# Patient Record
Sex: MALE | Race: WHITE | NOT HISPANIC OR LATINO | Employment: OTHER | RURAL
[De-identification: names, ages, dates, MRNs, and addresses within clinical notes are randomized per-mention and may not be internally consistent; named-entity substitution may affect disease eponyms.]

---

## 2018-01-03 ENCOUNTER — HISTORICAL (OUTPATIENT)
Dept: ADMINISTRATIVE | Facility: HOSPITAL | Age: 68
End: 2018-01-03

## 2018-01-04 LAB
LAB AP CLINICAL INFORMATION: NORMAL
LAB AP DIAGNOSIS - HISTORICAL: NORMAL
LAB AP GROSS PATHOLOGY - HISTORICAL: NORMAL
LAB AP SPECIMEN SUBMITTED - HISTORICAL: NORMAL

## 2020-09-24 ENCOUNTER — HISTORICAL (OUTPATIENT)
Dept: ADMINISTRATIVE | Facility: HOSPITAL | Age: 70
End: 2020-09-24

## 2020-09-24 LAB
ALBUMIN SERPL BCP-MCNC: 3.4 G/DL (ref 3.4–5)
ALBUMIN/GLOB SERPL: 0.9 {RATIO}
ALP SERPL-CCNC: 44 U/L (ref 46–116)
ALT SERPL W P-5'-P-CCNC: 15 U/L (ref 14–63)
ANION GAP SERPL CALCULATED.3IONS-SCNC: 15 MMOL/L
AST SERPL W P-5'-P-CCNC: 31 U/L (ref 15–37)
BASOPHILS # BLD AUTO: 0.02 X10E3/UL (ref 0–0.2)
BASOPHILS NFR BLD AUTO: 0.2 % (ref 0–1)
BILIRUB SERPL-MCNC: 0.9 MG/DL (ref 0.2–1)
BUN SERPL-MCNC: 20 MG/DL (ref 9–20)
BUN/CREAT SERPL: 13.6
CALCIUM SERPL-MCNC: 8.2 MG/DL (ref 8.5–10.1)
CHLORIDE SERPL-SCNC: 98 MMOL/L (ref 98–107)
CO2 SERPL-SCNC: 26 MMOL/L (ref 21–32)
CREAT SERPL-MCNC: 1.47 MG/DL (ref 0.66–1.25)
EOSINOPHIL # BLD AUTO: 0 X10E3/UL (ref 0–0.5)
EOSINOPHIL NFR BLD AUTO: 0 % (ref 1–4)
ERYTHROCYTE [DISTWIDTH] IN BLOOD BY AUTOMATED COUNT: 12.7 % (ref 11.5–14.5)
FLUAV AG UPPER RESP QL IA.RAPID: NEGATIVE
FLUBV AG UPPER RESP QL IA.RAPID: POSITIVE
GLOBULIN SER-MCNC: 4 G/DL
GLUCOSE SERPL-MCNC: 97 MG/DL (ref 74–106)
HCT VFR BLD AUTO: 39.7 % (ref 40–54)
HGB BLD-MCNC: 13.3 G/DL (ref 13.5–18)
LYMPHOCYTES # BLD AUTO: 0.88 X10E3/UL (ref 1–4.8)
LYMPHOCYTES NFR BLD AUTO: 9.4 % (ref 27–41)
MAGNESIUM SERPL-MCNC: 1.9 MG/DL (ref 1.8–2.4)
MCH RBC QN AUTO: 31.6 PG (ref 27–31)
MCHC RBC AUTO-ENTMCNC: 33.5 G/DL (ref 32–36)
MCV RBC AUTO: 94 FL (ref 80–96)
MONOCYTES # BLD AUTO: 0.74 X10E3/UL (ref 0–0.8)
MONOCYTES NFR BLD AUTO: 7.9 % (ref 2–6)
MPC BLD CALC-MCNC: 10 FL (ref 9.4–12.4)
NEUTROPHILS # BLD AUTO: 7.69 X10E3/UL (ref 1.8–7.7)
NEUTROPHILS NFR BLD AUTO: 82.5 % (ref 53–65)
NT-PROBNP SERPL-MCNC: 756 PG/ML (ref 5–125)
PLATELET # BLD AUTO: 141 X10E3/UL (ref 150–400)
POTASSIUM SERPL-SCNC: 4.2 MMOL/L (ref 3.5–5.1)
PROT SERPL-MCNC: 7.4 G/DL (ref 6.4–8.2)
RBC # BLD AUTO: 4.21 X10E6/UL (ref 4.6–6.2)
SODIUM SERPL-SCNC: 135 MMOL/L (ref 136–145)
TROPONIN I SERPL-MCNC: 0.04 NG/ML (ref 0–0.06)
TROPONIN I SERPL-MCNC: 0.17 NG/ML (ref 0–0.06)
WBC # BLD AUTO: 9.33 X10E3/UL (ref 4.5–11)

## 2020-09-25 ENCOUNTER — HISTORICAL (OUTPATIENT)
Dept: ADMINISTRATIVE | Facility: HOSPITAL | Age: 70
End: 2020-09-25

## 2020-09-25 LAB
ALBUMIN SERPL BCP-MCNC: 2.7 G/DL (ref 3.4–5)
ALBUMIN/GLOB SERPL: 0.7 {RATIO}
ALP SERPL-CCNC: 37 U/L (ref 46–116)
ALT SERPL W P-5'-P-CCNC: 33 U/L (ref 14–63)
AMORPH PHOS CRY #/AREA URNS LPF: ABNORMAL /LPF
ANION GAP SERPL CALCULATED.3IONS-SCNC: 14 MMOL/L
AST SERPL W P-5'-P-CCNC: 113 U/L (ref 15–37)
BACTERIA #/AREA URNS HPF: ABNORMAL /HPF
BASOPHILS # BLD AUTO: 0 X10E3/UL (ref 0–0.2)
BASOPHILS NFR BLD AUTO: 0 % (ref 0–1)
BILIRUB SERPL-MCNC: 0.6 MG/DL (ref 0.2–1)
BILIRUB UR QL STRIP: ABNORMAL MG/DL
BUN SERPL-MCNC: 23 MG/DL (ref 9–20)
BUN/CREAT SERPL: 18
CALCIUM SERPL-MCNC: 7.7 MG/DL (ref 8.5–10.1)
CHLORIDE SERPL-SCNC: 102 MMOL/L (ref 98–107)
CHOLEST SERPL-MCNC: 105 MG/DL
CHOLEST/HDLC SERPL: 2.2 {RATIO}
CLARITY UR: ABNORMAL
CLARITY UR: ABNORMAL
CO2 SERPL-SCNC: 24 MMOL/L (ref 21–32)
COLOR UR: ABNORMAL
COLOR UR: ABNORMAL
CREAT SERPL-MCNC: 1.28 MG/DL (ref 0.66–1.25)
EOSINOPHIL # BLD AUTO: 0 X10E3/UL (ref 0–0.5)
EOSINOPHIL NFR BLD AUTO: 0 % (ref 1–4)
ERYTHROCYTE [DISTWIDTH] IN BLOOD BY AUTOMATED COUNT: 12.7 % (ref 11.5–14.5)
GLOBULIN SER-MCNC: 3.8 G/DL
GLUCOSE SERPL-MCNC: 141 MG/DL (ref 74–106)
GLUCOSE UR STRIP-MCNC: NORMAL MG/DL
HCT VFR BLD AUTO: 36.8 % (ref 40–54)
HDLC SERPL-MCNC: 47 MG/DL
HGB BLD-MCNC: 12.6 G/DL (ref 13.5–18)
KETONES UR STRIP-SCNC: ABNORMAL MG/DL
LDLC SERPL CALC-MCNC: 41 MG/DL
LEUKOCYTE ESTERASE UR QL STRIP: NEGATIVE LEU/UL
LYMPHOCYTES # BLD AUTO: 0.48 X10E3/UL (ref 1–4.8)
LYMPHOCYTES NFR BLD AUTO: 4.5 % (ref 27–41)
LYMPHOCYTES NFR BLD MANUAL: 6 % (ref 27–41)
MCH RBC QN AUTO: 32.2 PG (ref 27–31)
MCHC RBC AUTO-ENTMCNC: 34.2 G/DL (ref 32–36)
MCV RBC AUTO: 94 FL (ref 80–96)
MONOCYTES # BLD AUTO: 0.33 X10E3/UL (ref 0–0.8)
MONOCYTES NFR BLD AUTO: 3.1 % (ref 2–6)
MONOCYTES NFR BLD MANUAL: 3 % (ref 2–6)
MPC BLD CALC-MCNC: 10.1 FL (ref 9.4–12.4)
MUCOUS THREADS #/AREA URNS HPF: ABNORMAL /HPF
NEUTROPHILS # BLD AUTO: 9.86 X10E3/UL (ref 1.8–7.7)
NEUTROPHILS NFR BLD AUTO: 92.4 % (ref 53–65)
NEUTS BAND NFR BLD MANUAL: 14 % (ref 1–5)
NEUTS SEG NFR BLD MANUAL: 77 % (ref 50–62)
NITRITE UR QL STRIP: NEGATIVE MG/DL
NT-PROBNP SERPL-MCNC: 1258 PG/ML (ref 5–125)
PH UR STRIP: 5.5 PH UNITS (ref 5–8)
PLATELET # BLD AUTO: 139 X10E3/UL (ref 150–400)
PLATELET MORPHOLOGY: ABNORMAL
POTASSIUM SERPL-SCNC: 4 MMOL/L (ref 3.5–5.1)
PROT SERPL-MCNC: 6.5 G/DL (ref 6.4–8.2)
PROT UR QL STRIP: 100 MG/DL
RBC # BLD AUTO: 3.91 X10E6/UL (ref 4.6–6.2)
RBC # UR STRIP: ABNORMAL ERY/UL
RBC #/AREA URNS HPF: ABNORMAL /HPF (ref 0–3)
RBC MORPH BLD: NORMAL
SODIUM SERPL-SCNC: 136 MMOL/L (ref 136–145)
SP GR UR STRIP: >=1.03 (ref 1–1.03)
SQUAMOUS #/AREA URNS LPF: ABNORMAL /LPF
TRIGL SERPL-MCNC: 83 MG/DL
TROPONIN I SERPL-MCNC: 0.09 NG/ML (ref 0–0.06)
TROPONIN I SERPL-MCNC: 0.1 NG/ML (ref 0–0.06)
TROPONIN I SERPL-MCNC: 0.15 NG/ML (ref 0–0.06)
UROBILINOGEN UR STRIP-ACNC: 0.2 MG/DL
WBC # BLD AUTO: 10.67 X10E3/UL (ref 4.5–11)
WBC #/AREA URNS HPF: ABNORMAL /HPF (ref 0–5)

## 2020-09-26 ENCOUNTER — HISTORICAL (OUTPATIENT)
Dept: ADMINISTRATIVE | Facility: HOSPITAL | Age: 70
End: 2020-09-26

## 2020-09-26 LAB — TROPONIN I SERPL-MCNC: 0.07 NG/ML (ref 0–0.06)

## 2020-09-27 LAB
REPORT: NO GROWTH
REPORT: NORMAL

## 2020-10-01 LAB
REPORT: NORMAL

## 2021-08-30 DIAGNOSIS — C44.229 SQUAMOUS CELL CANCER OF SKIN OF HELIX OF LEFT EAR: Primary | ICD-10-CM

## 2022-05-20 ENCOUNTER — OFFICE VISIT (OUTPATIENT)
Dept: FAMILY MEDICINE | Facility: CLINIC | Age: 72
End: 2022-05-20
Payer: MEDICARE

## 2022-05-20 VITALS
DIASTOLIC BLOOD PRESSURE: 71 MMHG | HEIGHT: 69 IN | WEIGHT: 164 LBS | BODY MASS INDEX: 24.29 KG/M2 | SYSTOLIC BLOOD PRESSURE: 113 MMHG | HEART RATE: 74 BPM

## 2022-05-20 DIAGNOSIS — Z76.0 PRESCRIPTION REFILL: ICD-10-CM

## 2022-05-20 DIAGNOSIS — L30.9 DERMATITIS: Primary | ICD-10-CM

## 2022-05-20 PROCEDURE — 1160F PR REVIEW ALL MEDS BY PRESCRIBER/CLIN PHARMACIST DOCUMENTED: ICD-10-PCS | Mod: ,,, | Performed by: FAMILY MEDICINE

## 2022-05-20 PROCEDURE — 1159F MED LIST DOCD IN RCRD: CPT | Mod: ,,, | Performed by: FAMILY MEDICINE

## 2022-05-20 PROCEDURE — 1160F RVW MEDS BY RX/DR IN RCRD: CPT | Mod: ,,, | Performed by: FAMILY MEDICINE

## 2022-05-20 PROCEDURE — 1159F PR MEDICATION LIST DOCUMENTED IN MEDICAL RECORD: ICD-10-PCS | Mod: ,,, | Performed by: FAMILY MEDICINE

## 2022-05-20 PROCEDURE — 3008F PR BODY MASS INDEX (BMI) DOCUMENTED: ICD-10-PCS | Mod: ,,, | Performed by: FAMILY MEDICINE

## 2022-05-20 PROCEDURE — 99213 PR OFFICE/OUTPT VISIT, EST, LEVL III, 20-29 MIN: ICD-10-PCS | Mod: ,,, | Performed by: FAMILY MEDICINE

## 2022-05-20 PROCEDURE — 3074F SYST BP LT 130 MM HG: CPT | Mod: ,,, | Performed by: FAMILY MEDICINE

## 2022-05-20 PROCEDURE — 3074F PR MOST RECENT SYSTOLIC BLOOD PRESSURE < 130 MM HG: ICD-10-PCS | Mod: ,,, | Performed by: FAMILY MEDICINE

## 2022-05-20 PROCEDURE — 3008F BODY MASS INDEX DOCD: CPT | Mod: ,,, | Performed by: FAMILY MEDICINE

## 2022-05-20 PROCEDURE — 3078F PR MOST RECENT DIASTOLIC BLOOD PRESSURE < 80 MM HG: ICD-10-PCS | Mod: ,,, | Performed by: FAMILY MEDICINE

## 2022-05-20 PROCEDURE — 99213 OFFICE O/P EST LOW 20 MIN: CPT | Mod: ,,, | Performed by: FAMILY MEDICINE

## 2022-05-20 PROCEDURE — 3078F DIAST BP <80 MM HG: CPT | Mod: ,,, | Performed by: FAMILY MEDICINE

## 2022-05-20 RX ORDER — CARVEDILOL 3.12 MG/1
3.12 TABLET ORAL 2 TIMES DAILY WITH MEALS
COMMUNITY

## 2022-05-20 RX ORDER — ROSUVASTATIN CALCIUM 40 MG/1
40 TABLET, COATED ORAL DAILY
COMMUNITY
Start: 2022-05-11

## 2022-05-20 RX ORDER — NITROGLYCERIN 0.4 MG/1
0.4 TABLET SUBLINGUAL EVERY 5 MIN PRN
COMMUNITY

## 2022-05-20 RX ORDER — FLUCONAZOLE 150 MG/1
150 TABLET ORAL DAILY
COMMUNITY
End: 2022-05-20 | Stop reason: SDUPTHER

## 2022-05-20 RX ORDER — LOSARTAN POTASSIUM 25 MG/1
25 TABLET ORAL DAILY
COMMUNITY
Start: 2021-12-01

## 2022-05-20 RX ORDER — ASPIRIN 81 MG/1
81 TABLET ORAL DAILY
COMMUNITY

## 2022-05-20 RX ORDER — CLOPIDOGREL BISULFATE 75 MG/1
75 TABLET ORAL DAILY
COMMUNITY
Start: 2022-05-17

## 2022-05-20 RX ORDER — FLUCONAZOLE 150 MG/1
TABLET ORAL
Qty: 2 TABLET | Refills: 1 | Status: SHIPPED | OUTPATIENT
Start: 2022-05-20 | End: 2022-09-20 | Stop reason: SDUPTHER

## 2022-05-20 RX ORDER — ESOMEPRAZOLE MAGNESIUM 40 MG/1
40 CAPSULE, DELAYED RELEASE ORAL
COMMUNITY

## 2022-05-20 NOTE — PROGRESS NOTES
Domingo Wayne MD   [facility]  MEDICAL GROUP [address] [address]  [phone]      PATIENT NAME: Eugene Morrow  : 1950  DATE: 22  MRN: [id number]      Billing Provider: Domingo Wayne MD  Level of Service:   Patient PCP Information       Provider PCP Type    Domingo Wayne MD General            Reason for Visit / Chief Complaint: Medication Refill       Update PCP  Update Chief Complaint         History of Present Illness / Problem Focused Workflow     Eugene Morrow presents to the clinic with Medication Refill       70 yo WM with rash on torso.  Has had similar before that responded very rapidly to diflucan.  Would like refill.      Medication Refill  Associated symptoms include a rash. Pertinent negatives include no chills, coughing, fever or sore throat.       Review of Systems     Review of Systems   Constitutional: Negative for chills and fever.   HENT: Negative for sore throat.    Respiratory: Negative for cough.    Integumentary:  Positive for rash.        Medical / Social / Family History   History reviewed. No pertinent past medical history.    History reviewed. No pertinent surgical history.    Social History    reports that he has been smoking cigarettes. He has never used smokeless tobacco. He reports current alcohol use.   Social History     Tobacco Use    Smoking status: Current Every Day Smoker     Types: Cigarettes    Smokeless tobacco: Never Used   Substance Use Topics    Alcohol use: Yes       Family History  History reviewed. No pertinent family history.    Medications and Allergies     Medications  No outpatient medications have been marked as taking for the 22 encounter (Office Visit) with Domingo Wayne MD.       Allergies  Review of patient's allergies indicates:   Allergen Reactions    Brilinta [ticagrelor]        Physical Examination     Vitals:    22 1406    BP: 113/71   Pulse: 74     Physical Exam  Vitals reviewed.   Constitutional:       Appearance: Normal appearance.   HENT:      Head: Normocephalic and atraumatic.   Eyes:      Extraocular Movements: Extraocular movements intact.      Conjunctiva/sclera: Conjunctivae normal.      Pupils: Pupils are equal, round, and reactive to light.   Cardiovascular:      Rate and Rhythm: Normal rate and regular rhythm.      Heart sounds: Normal heart sounds.   Pulmonary:      Effort: Pulmonary effort is normal.      Breath sounds: Normal breath sounds.   Musculoskeletal:         General: Normal range of motion.      Cervical back: Normal range of motion and neck supple.   Skin:     General: Skin is warm and dry.      Findings: Rash (erythema upper chest) present.   Neurological:      General: No focal deficit present.      Mental Status: He is alert and oriented to person, place, and time.   Psychiatric:         Mood and Affect: Mood normal.         Behavior: Behavior normal.          Assessment and Plan (including Health Maintenance)      Problem List  Smart Traackr  Document Outside HM   :    Plan:         Health Maintenance Due   Topic Date Due    Hepatitis C Screening  Never done    Lipid Panel  Never done    COVID-19 Vaccine (1) Never done    Pneumococcal Vaccines (Age 65+) (1 - PCV) Never done    TETANUS VACCINE  Never done    Colorectal Cancer Screening  Never done    Shingles Vaccine (1 of 2) Never done    Abdominal Aortic Aneurysm Screening  Never done       Problem List Items Addressed This Visit    None     Visit Diagnoses     Dermatitis    -  Primary    Relevant Medications    fluconazole (DIFLUCAN) 150 MG Tab    Prescription refill              Health Maintenance Topics with due status: Not Due       Topic Last Completion Date    Influenza Vaccine Not Due       No future appointments.         Signature:  MD BABATUNDE Cheek [facility]  MEDICAL GROUP [facility]  [address]  [facility] [address]  [phone]    Date of encounter: 5/20/22

## 2022-08-11 ENCOUNTER — OFFICE VISIT (OUTPATIENT)
Dept: FAMILY MEDICINE | Facility: CLINIC | Age: 72
End: 2022-08-11
Payer: MEDICARE

## 2022-08-11 VITALS
HEART RATE: 83 BPM | SYSTOLIC BLOOD PRESSURE: 99 MMHG | BODY MASS INDEX: 23.55 KG/M2 | TEMPERATURE: 101 F | DIASTOLIC BLOOD PRESSURE: 61 MMHG | WEIGHT: 159 LBS | HEIGHT: 69 IN

## 2022-08-11 DIAGNOSIS — J34.89 STUFFY AND RUNNY NOSE: ICD-10-CM

## 2022-08-11 DIAGNOSIS — R50.9 SUBJECTIVE FEVER: Primary | ICD-10-CM

## 2022-08-11 DIAGNOSIS — R05.9 COUGH: ICD-10-CM

## 2022-08-11 LAB
CTP QC/QA: YES
FLUAV AG NPH QL: NEGATIVE
FLUBV AG NPH QL: NEGATIVE
SARS-COV-2 AG RESP QL IA.RAPID: NEGATIVE

## 2022-08-11 PROCEDURE — 4010F PR ACE/ARB THEARPY RXD/TAKEN: ICD-10-PCS | Mod: ,,, | Performed by: FAMILY MEDICINE

## 2022-08-11 PROCEDURE — 1160F PR REVIEW ALL MEDS BY PRESCRIBER/CLIN PHARMACIST DOCUMENTED: ICD-10-PCS | Mod: ,,, | Performed by: FAMILY MEDICINE

## 2022-08-11 PROCEDURE — 1160F RVW MEDS BY RX/DR IN RCRD: CPT | Mod: ,,, | Performed by: FAMILY MEDICINE

## 2022-08-11 PROCEDURE — 3008F BODY MASS INDEX DOCD: CPT | Mod: ,,, | Performed by: FAMILY MEDICINE

## 2022-08-11 PROCEDURE — 99213 OFFICE O/P EST LOW 20 MIN: CPT | Mod: ,,, | Performed by: FAMILY MEDICINE

## 2022-08-11 PROCEDURE — 3078F PR MOST RECENT DIASTOLIC BLOOD PRESSURE < 80 MM HG: ICD-10-PCS | Mod: ,,, | Performed by: FAMILY MEDICINE

## 2022-08-11 PROCEDURE — 99213 PR OFFICE/OUTPT VISIT, EST, LEVL III, 20-29 MIN: ICD-10-PCS | Mod: ,,, | Performed by: FAMILY MEDICINE

## 2022-08-11 PROCEDURE — 3074F PR MOST RECENT SYSTOLIC BLOOD PRESSURE < 130 MM HG: ICD-10-PCS | Mod: ,,, | Performed by: FAMILY MEDICINE

## 2022-08-11 PROCEDURE — 3078F DIAST BP <80 MM HG: CPT | Mod: ,,, | Performed by: FAMILY MEDICINE

## 2022-08-11 PROCEDURE — 87428 SARSCOV & INF VIR A&B AG IA: CPT | Mod: RHCUB | Performed by: FAMILY MEDICINE

## 2022-08-11 PROCEDURE — 1159F PR MEDICATION LIST DOCUMENTED IN MEDICAL RECORD: ICD-10-PCS | Mod: ,,, | Performed by: FAMILY MEDICINE

## 2022-08-11 PROCEDURE — 3074F SYST BP LT 130 MM HG: CPT | Mod: ,,, | Performed by: FAMILY MEDICINE

## 2022-08-11 PROCEDURE — 3008F PR BODY MASS INDEX (BMI) DOCUMENTED: ICD-10-PCS | Mod: ,,, | Performed by: FAMILY MEDICINE

## 2022-08-11 PROCEDURE — 1159F MED LIST DOCD IN RCRD: CPT | Mod: ,,, | Performed by: FAMILY MEDICINE

## 2022-08-11 PROCEDURE — 4010F ACE/ARB THERAPY RXD/TAKEN: CPT | Mod: ,,, | Performed by: FAMILY MEDICINE

## 2022-08-11 NOTE — PROGRESS NOTES
Jamie Rosas IV, DO  [facility]  [address] [place] [address]  Phone: [phone]      Subjective     Name: Eugene Morrow   Sex: male  YOB: 1950 (71 y.o.)  MRN: [id number]  Visit Date: 08/11/2022   Chief Complaint: Fever, Generalized Body Aches (No appetite/Started monday), Arm Pain, and Nasal Congestion        HISTORY OF PRESENT ILLNESS:    Patient has cough and stuffy nose for 3 days now, onset Monday.  covid and flu negative in office but patient is not vaccinated and high suspicion for SARS-CoV-2.  Advised supportive care and sent home 2 tests for patient to follow.  Also requested that he take his temperature objectively for the next few days.  Patient to call if +ve result on home test or if symptoms worsen.  Plan courtesy call on Monday to check progression.        PAST MEDICAL HISTORY:  Significant Diagnoses - Patient  has no past medical history on file.  Medications - Patient has a current medication list which includes the following long-term medication(s): aspirin, carvedilol, clopidogrel, esomeprazole, losartan, nitroglycerin, and rosuvastatin.   Allergies - Patient is allergic to brilinta [ticagrelor].  Surgeries - Patient  has no past surgical history on file.  Family History - Patient family history is not on file.      SOCIAL HISTORY:  Tobacco - Patient  reports that he has been smoking cigarettes. He has never used smokeless tobacco.   Alcohol - Patient  reports current alcohol use.   Recreational Drugs - Patient  has no history on file for drug use.       Review of Systems   Constitutional: Negative for fatigue and fever.   HENT: Positive for rhinorrhea. Negative for hearing loss, sore throat and tinnitus.    Eyes: Negative for pain.   Respiratory: Positive for cough. Negative for chest tightness and shortness of breath.    Cardiovascular: Negative for chest pain.   Endocrine: Negative for polydipsia, polyphagia and polyuria.  "  Genitourinary: Negative for dysuria.   Integumentary:  Negative for pallor, rash and wound.   Allergic/Immunologic: Negative.    Neurological: Positive for light-headedness. Negative for seizures, syncope, speech difficulty, numbness and headaches.   Psychiatric/Behavioral: Negative for agitation and suicidal ideas.   All other systems reviewed and are negative.         History reviewed. No pertinent past medical history.     Review of patient's allergies indicates:   Allergen Reactions    Brilinta [ticagrelor]         History reviewed. No pertinent surgical history.     History reviewed. No pertinent family history.    Current Outpatient Medications   Medication Instructions    aspirin (ECOTRIN) 81 mg, Oral, Daily    carvediloL (COREG) 3.125 mg, Oral, 2 times daily with meals    clopidogreL (PLAVIX) 75 mg, Oral, Daily    esomeprazole (NEXIUM) 40 mg, Oral, Before breakfast    fluconazole (DIFLUCAN) 150 MG Tab Take one tablet PO now and repeat in 3 days    losartan (COZAAR) 25 mg, Oral, Daily    nitroGLYCERIN (NITROSTAT) 0.4 mg, Sublingual, Every 5 min PRN    rosuvastatin (CRESTOR) 40 mg, Oral, Daily        Objective     BP 99/61   Pulse 83   Temp (!) 101.3 °F (38.5 °C)   Ht 5' 9" (1.753 m)   Wt 72.1 kg (159 lb)   BMI 23.48 kg/m²     Physical Exam  Constitutional:       General: He is not in acute distress.     Appearance: Normal appearance. He is not ill-appearing.   HENT:      Head: Normocephalic and atraumatic.      Right Ear: External ear normal.      Left Ear: External ear normal.   Eyes:      Extraocular Movements: Extraocular movements intact.      Conjunctiva/sclera: Conjunctivae normal.   Cardiovascular:      Rate and Rhythm: Normal rate and regular rhythm.      Heart sounds: Normal heart sounds.   Pulmonary:      Effort: Pulmonary effort is normal. No respiratory distress.      Breath sounds: Normal breath sounds. No wheezing, rhonchi or rales.   Abdominal:      General: Abdomen is flat.     "  Palpations: Abdomen is soft.   Musculoskeletal:      Cervical back: Normal range of motion. No rigidity or tenderness.   Skin:     General: Skin is warm and dry.   Neurological:      Mental Status: He is alert.   Psychiatric:         Mood and Affect: Mood normal.         Behavior: Behavior normal.          All recently obtained labs have been reviewed and discussed in detail with the patient.   Assessment     1. Subjective fever    2. Cough    3. Stuffy and runny nose         Plan        Problem List Items Addressed This Visit    None     Visit Diagnoses     Subjective fever    -  Primary    Relevant Orders    POCT SARS-COV2 (COVID) with Flu Antigen (Completed)    Cough        Stuffy and runny nose              Follow up if symptoms worsen or fail to improve.    Jamie Rosas, DO  [facility]

## 2022-08-15 ENCOUNTER — TELEPHONE (OUTPATIENT)
Dept: FAMILY MEDICINE | Facility: CLINIC | Age: 72
End: 2022-08-15
Payer: MEDICARE

## 2022-08-15 NOTE — TELEPHONE ENCOUNTER
Called patient to check on him.  Wife states he was in the hospital until yesterday and did not have covid.  She was unhappy with me for suspecting covid and hung phone up on me before I could ask anything further.

## 2022-09-20 DIAGNOSIS — L30.9 DERMATITIS: ICD-10-CM

## 2022-09-20 RX ORDER — FLUCONAZOLE 150 MG/1
TABLET ORAL
Qty: 2 TABLET | Refills: 1 | Status: SHIPPED | OUTPATIENT
Start: 2022-09-20 | End: 2022-09-21 | Stop reason: SDUPTHER

## 2022-09-21 DIAGNOSIS — L30.9 DERMATITIS: ICD-10-CM

## 2022-09-21 RX ORDER — FLUCONAZOLE 150 MG/1
TABLET ORAL
Qty: 2 TABLET | Refills: 1 | Status: SHIPPED | OUTPATIENT
Start: 2022-09-21 | End: 2023-11-16 | Stop reason: SDUPTHER

## 2023-05-09 ENCOUNTER — OFFICE VISIT (OUTPATIENT)
Dept: FAMILY MEDICINE | Facility: CLINIC | Age: 73
End: 2023-05-09
Payer: MEDICARE

## 2023-05-09 VITALS — SYSTOLIC BLOOD PRESSURE: 112 MMHG | DIASTOLIC BLOOD PRESSURE: 81 MMHG | HEART RATE: 73 BPM

## 2023-05-09 DIAGNOSIS — J06.9 UPPER RESPIRATORY TRACT INFECTION, UNSPECIFIED TYPE: Primary | ICD-10-CM

## 2023-05-09 DIAGNOSIS — J31.0 RHINITIS, UNSPECIFIED TYPE: Chronic | ICD-10-CM

## 2023-05-09 DIAGNOSIS — I25.2 HISTORY OF MI (MYOCARDIAL INFARCTION): Chronic | ICD-10-CM

## 2023-05-09 PROCEDURE — 1159F MED LIST DOCD IN RCRD: CPT | Mod: ,,, | Performed by: FAMILY MEDICINE

## 2023-05-09 PROCEDURE — 1160F PR REVIEW ALL MEDS BY PRESCRIBER/CLIN PHARMACIST DOCUMENTED: ICD-10-PCS | Mod: ,,, | Performed by: FAMILY MEDICINE

## 2023-05-09 PROCEDURE — 1159F PR MEDICATION LIST DOCUMENTED IN MEDICAL RECORD: ICD-10-PCS | Mod: ,,, | Performed by: FAMILY MEDICINE

## 2023-05-09 PROCEDURE — 3074F PR MOST RECENT SYSTOLIC BLOOD PRESSURE < 130 MM HG: ICD-10-PCS | Mod: ,,, | Performed by: FAMILY MEDICINE

## 2023-05-09 PROCEDURE — 4010F PR ACE/ARB THEARPY RXD/TAKEN: ICD-10-PCS | Mod: ,,, | Performed by: FAMILY MEDICINE

## 2023-05-09 PROCEDURE — 3074F SYST BP LT 130 MM HG: CPT | Mod: ,,, | Performed by: FAMILY MEDICINE

## 2023-05-09 PROCEDURE — 1160F RVW MEDS BY RX/DR IN RCRD: CPT | Mod: ,,, | Performed by: FAMILY MEDICINE

## 2023-05-09 PROCEDURE — 99214 OFFICE O/P EST MOD 30 MIN: CPT | Mod: ,,, | Performed by: FAMILY MEDICINE

## 2023-05-09 PROCEDURE — 4010F ACE/ARB THERAPY RXD/TAKEN: CPT | Mod: ,,, | Performed by: FAMILY MEDICINE

## 2023-05-09 PROCEDURE — 99214 PR OFFICE/OUTPT VISIT, EST, LEVL IV, 30-39 MIN: ICD-10-PCS | Mod: ,,, | Performed by: FAMILY MEDICINE

## 2023-05-09 PROCEDURE — 3079F PR MOST RECENT DIASTOLIC BLOOD PRESSURE 80-89 MM HG: ICD-10-PCS | Mod: ,,, | Performed by: FAMILY MEDICINE

## 2023-05-09 PROCEDURE — 3079F DIAST BP 80-89 MM HG: CPT | Mod: ,,, | Performed by: FAMILY MEDICINE

## 2023-05-09 RX ORDER — IPRATROPIUM BROMIDE 21 UG/1
2 SPRAY, METERED NASAL 3 TIMES DAILY
Qty: 30 ML | Refills: 1 | Status: SHIPPED | OUTPATIENT
Start: 2023-05-09

## 2023-05-09 RX ORDER — LEVOCETIRIZINE DIHYDROCHLORIDE 5 MG/1
5 TABLET, FILM COATED ORAL NIGHTLY
Qty: 30 TABLET | Refills: 1 | Status: SHIPPED | OUTPATIENT
Start: 2023-05-09 | End: 2024-05-08

## 2023-05-09 NOTE — PROGRESS NOTES
Domingo Wayne MD   [email or web address]  MEDICAL GROUP [facility] FAMILY [address] [address]  [phone]      PATIENT NAME: Eugene Morrow  : 1950  DATE: 23  MRN: [id number]      Billing Provider: Domingo Wayne MD  Level of Service:   Patient PCP Information       Provider PCP Type    Domingo Wayne MD General            Reason for Visit / Chief Complaint: Cough, Sinus Problem, Headache, and Sore Throat       Update PCP  Update Chief Complaint         History of Present Illness / Problem Focused Workflow     Eugene Morrow presents to the clinic with Cough, Sinus Problem, Headache, and Sore Throat       Reports that symptoms seemed to have started after cutting grass.  Has tried otc sinus pill.  Is having chest congestion, cough, runny nose and sore throat.      Review of Systems     Review of Systems   Constitutional:  Negative for chills, fatigue and fever.   HENT:  Positive for nasal congestion, rhinorrhea and sore throat. Negative for sinus pressure/congestion and trouble swallowing.    Respiratory:  Positive for cough. Negative for shortness of breath and wheezing.    Cardiovascular:  Negative for chest pain, palpitations and leg swelling.   Gastrointestinal:  Negative for abdominal pain, change in bowel habit, nausea, vomiting and change in bowel habit.   Integumentary:  Negative for rash.   Neurological:  Negative for dizziness, syncope, weakness, light-headedness, numbness and headaches.   Psychiatric/Behavioral:  Negative for confusion.       Medical / Social / Family History   History reviewed. No pertinent past medical history.    History reviewed. No pertinent surgical history.    Social History    reports that he has been smoking cigarettes. He has never used smokeless tobacco. He reports current alcohol use.   Social History     Tobacco Use    Smoking status: Every Day     Types: Cigarettes    Smokeless tobacco:  Never   Substance Use Topics    Alcohol use: Yes       Family History  History reviewed. No pertinent family history.    Medications and Allergies     Medications  No outpatient medications have been marked as taking for the 5/9/23 encounter (Office Visit) with Domingo Wayne MD.       Allergies  Review of patient's allergies indicates:   Allergen Reactions    Brilinta [ticagrelor]        Physical Examination     Vitals:    05/09/23 1017   BP: 112/81   Pulse: 73     Physical Exam  Vitals reviewed.   Constitutional:       Appearance: Normal appearance.   HENT:      Head: Normocephalic and atraumatic.   Eyes:      Extraocular Movements: Extraocular movements intact.      Conjunctiva/sclera: Conjunctivae normal.      Pupils: Pupils are equal, round, and reactive to light.   Cardiovascular:      Rate and Rhythm: Normal rate and regular rhythm.      Heart sounds: Normal heart sounds.   Pulmonary:      Effort: Pulmonary effort is normal.      Breath sounds: Normal breath sounds.   Musculoskeletal:         General: Normal range of motion.      Cervical back: Normal range of motion and neck supple.   Skin:     General: Skin is warm and dry.   Neurological:      General: No focal deficit present.      Mental Status: He is alert and oriented to person, place, and time.   Psychiatric:         Mood and Affect: Mood normal.         Behavior: Behavior normal.        Assessment and Plan (including Health Maintenance)      Problem List  Smart Sets  Document Outside HM   :    Plan: avoid decongestants due to h/o MI        Health Maintenance Due   Topic Date Due    Hepatitis C Screening  Never done    Lipid Panel  Never done    COVID-19 Vaccine (1) Never done    Pneumococcal Vaccines (Age 65+) (1 - PCV) Never done    TETANUS VACCINE  Never done    Colorectal Cancer Screening  Never done    Shingles Vaccine (1 of 2) Never done    Abdominal Aortic Aneurysm Screening  Never done       Problem List Items Addressed This Visit     None  Visit Diagnoses       Upper respiratory tract infection, unspecified type    -  Primary    Relevant Medications    levocetirizine (XYZAL) 5 MG tablet    Rhinitis, unspecified type        Relevant Medications    levocetirizine (XYZAL) 5 MG tablet    ipratropium (ATROVENT) 21 mcg (0.03 %) nasal spray            Health Maintenance Topics with due status: Not Due       Topic Last Completion Date    Influenza Vaccine Not Due       No future appointments.         Signature:  MD BBAATUNDE Cheek [facility]  MEDICAL GROUP [facility]  [address] [place] [id number]  [phone]    Date of encounter: 5/9/23

## 2023-11-16 ENCOUNTER — OFFICE VISIT (OUTPATIENT)
Dept: FAMILY MEDICINE | Facility: CLINIC | Age: 73
End: 2023-11-16
Payer: MEDICARE

## 2023-11-16 VITALS
SYSTOLIC BLOOD PRESSURE: 137 MMHG | WEIGHT: 160.81 LBS | BODY MASS INDEX: 23.82 KG/M2 | HEART RATE: 68 BPM | DIASTOLIC BLOOD PRESSURE: 72 MMHG | HEIGHT: 69 IN

## 2023-11-16 DIAGNOSIS — J30.1 SEASONAL ALLERGIC RHINITIS DUE TO POLLEN: Primary | Chronic | ICD-10-CM

## 2023-11-16 DIAGNOSIS — J06.9 UPPER RESPIRATORY TRACT INFECTION, UNSPECIFIED TYPE: ICD-10-CM

## 2023-11-16 DIAGNOSIS — L30.9 DERMATITIS: Chronic | ICD-10-CM

## 2023-11-16 PROCEDURE — 3288F PR FALLS RISK ASSESSMENT DOCUMENTED: ICD-10-PCS | Mod: ,,, | Performed by: FAMILY MEDICINE

## 2023-11-16 PROCEDURE — 3078F DIAST BP <80 MM HG: CPT | Mod: ,,, | Performed by: FAMILY MEDICINE

## 2023-11-16 PROCEDURE — 1160F PR REVIEW ALL MEDS BY PRESCRIBER/CLIN PHARMACIST DOCUMENTED: ICD-10-PCS | Mod: ,,, | Performed by: FAMILY MEDICINE

## 2023-11-16 PROCEDURE — 1101F PR PT FALLS ASSESS DOC 0-1 FALLS W/OUT INJ PAST YR: ICD-10-PCS | Mod: ,,, | Performed by: FAMILY MEDICINE

## 2023-11-16 PROCEDURE — 1159F PR MEDICATION LIST DOCUMENTED IN MEDICAL RECORD: ICD-10-PCS | Mod: ,,, | Performed by: FAMILY MEDICINE

## 2023-11-16 PROCEDURE — 4010F ACE/ARB THERAPY RXD/TAKEN: CPT | Mod: ,,, | Performed by: FAMILY MEDICINE

## 2023-11-16 PROCEDURE — 99214 PR OFFICE/OUTPT VISIT, EST, LEVL IV, 30-39 MIN: ICD-10-PCS | Mod: ,,, | Performed by: FAMILY MEDICINE

## 2023-11-16 PROCEDURE — 1101F PT FALLS ASSESS-DOCD LE1/YR: CPT | Mod: ,,, | Performed by: FAMILY MEDICINE

## 2023-11-16 PROCEDURE — 3075F SYST BP GE 130 - 139MM HG: CPT | Mod: ,,, | Performed by: FAMILY MEDICINE

## 2023-11-16 PROCEDURE — 3078F PR MOST RECENT DIASTOLIC BLOOD PRESSURE < 80 MM HG: ICD-10-PCS | Mod: ,,, | Performed by: FAMILY MEDICINE

## 2023-11-16 PROCEDURE — 1126F PR PAIN SEVERITY QUANTIFIED, NO PAIN PRESENT: ICD-10-PCS | Mod: ,,, | Performed by: FAMILY MEDICINE

## 2023-11-16 PROCEDURE — 1160F RVW MEDS BY RX/DR IN RCRD: CPT | Mod: ,,, | Performed by: FAMILY MEDICINE

## 2023-11-16 PROCEDURE — 3288F FALL RISK ASSESSMENT DOCD: CPT | Mod: ,,, | Performed by: FAMILY MEDICINE

## 2023-11-16 PROCEDURE — 99214 OFFICE O/P EST MOD 30 MIN: CPT | Mod: ,,, | Performed by: FAMILY MEDICINE

## 2023-11-16 PROCEDURE — 3075F PR MOST RECENT SYSTOLIC BLOOD PRESS GE 130-139MM HG: ICD-10-PCS | Mod: ,,, | Performed by: FAMILY MEDICINE

## 2023-11-16 PROCEDURE — 1126F AMNT PAIN NOTED NONE PRSNT: CPT | Mod: ,,, | Performed by: FAMILY MEDICINE

## 2023-11-16 PROCEDURE — 1159F MED LIST DOCD IN RCRD: CPT | Mod: ,,, | Performed by: FAMILY MEDICINE

## 2023-11-16 PROCEDURE — 4010F PR ACE/ARB THEARPY RXD/TAKEN: ICD-10-PCS | Mod: ,,, | Performed by: FAMILY MEDICINE

## 2023-11-16 RX ORDER — DEXAMETHASONE 0.75 MG/1
TABLET ORAL
Qty: 15 TABLET | Refills: 0 | Status: SHIPPED | OUTPATIENT
Start: 2023-11-16

## 2023-11-16 RX ORDER — FLUCONAZOLE 150 MG/1
TABLET ORAL
Qty: 2 TABLET | Refills: 1 | Status: SHIPPED | OUTPATIENT
Start: 2023-11-16

## 2023-11-16 RX ORDER — AZITHROMYCIN 250 MG/1
TABLET, FILM COATED ORAL
Qty: 6 TABLET | Refills: 0 | Status: SHIPPED | OUTPATIENT
Start: 2023-11-16 | End: 2023-11-21

## 2023-11-16 RX ORDER — PROMETHAZINE HYDROCHLORIDE AND DEXTROMETHORPHAN HYDROBROMIDE 6.25; 15 MG/5ML; MG/5ML
5 SYRUP ORAL EVERY 4 HOURS PRN
Qty: 120 ML | Refills: 0 | Status: SHIPPED | OUTPATIENT
Start: 2023-11-16 | End: 2023-11-26

## 2023-11-16 NOTE — PROGRESS NOTES
Domingo Wayne MD   [facility]  MEDICAL GROUP [address] [address]  [phone]      PATIENT NAME: Eugene Morrow  : 1950  DATE: 23  MRN: [id number]      Billing Provider: Domingo Wayne MD  Level of Service:   Patient PCP Information       Provider PCP Type    Domingo Wayne MD General            Reason for Visit / Chief Complaint: Cough, Sinus Problem, Otalgia, and Headache (X 3 weeks)       Update PCP  Update Chief Complaint         History of Present Illness / Problem Focused Workflow     Eugene Morrow presents to the clinic with Cough, Sinus Problem, Otalgia, and Headache (X 3 weeks)       3 week history sinus congestion, drainage, facial tenderness, cough and chest congestion.  OTC sudafed not helping.        Review of Systems     Review of Systems   HENT:  Positive for nasal congestion, ear pain and sinus pressure/congestion.    Respiratory:  Positive for cough and wheezing. Negative for shortness of breath.    Gastrointestinal:  Negative for diarrhea and vomiting.   Neurological:  Positive for headaches. Negative for dizziness and vertigo.        Medical / Social / Family History     Past Medical History:   Diagnosis Date    Hypertension        History reviewed. No pertinent surgical history.    Social History    reports that he has been smoking cigarettes. He has never used smokeless tobacco. He reports current alcohol use.   Social History     Tobacco Use    Smoking status: Every Day     Types: Cigarettes    Smokeless tobacco: Never   Substance Use Topics    Alcohol use: Yes       Family History  Family History   Problem Relation Age of Onset    Heart disease Mother        Medications and Allergies     Medications  No outpatient medications have been marked as taking for the 23 encounter (Office Visit) with Domingo Wayne MD.       Allergies  Review of patient's allergies  indicates:   Allergen Reactions    Brilinta [ticagrelor]        Physical Examination     Vitals:    11/16/23 1041   BP: 137/72   Pulse:      Physical Exam  Vitals reviewed.   Constitutional:       Appearance: Normal appearance.   HENT:      Head: Normocephalic and atraumatic.      Nose: Congestion present.   Eyes:      Extraocular Movements: Extraocular movements intact.      Conjunctiva/sclera: Conjunctivae normal.      Pupils: Pupils are equal, round, and reactive to light.   Cardiovascular:      Rate and Rhythm: Normal rate and regular rhythm.   Pulmonary:      Effort: Pulmonary effort is normal.      Breath sounds: Normal breath sounds. No wheezing, rhonchi or rales.   Musculoskeletal:         General: Normal range of motion.   Skin:     General: Skin is warm and dry.   Neurological:      General: No focal deficit present.      Mental Status: He is alert and oriented to person, place, and time.   Psychiatric:         Mood and Affect: Mood normal.         Behavior: Behavior normal.          Assessment and Plan (including Health Maintenance)      Problem List  Smart Sets  Document Outside HM   :    Plan:     Sees cardiology, Dr. Eren ladd, and gets labs (lipids) checked there    Health Maintenance Due   Topic Date Due    Hepatitis C Screening  Never done    Lipid Panel  Never done    COVID-19 Vaccine (1) Never done    Pneumococcal Vaccines (Age 65+) (1 - PCV) Never done    TETANUS VACCINE  Never done    Colorectal Cancer Screening  Never done    Shingles Vaccine (1 of 2) Never done    RSV Vaccine (Age 60+) (1 - 1-dose 60+ series) Never done    Abdominal Aortic Aneurysm Screening  Never done    Influenza Vaccine (1) Never done       Problem List Items Addressed This Visit    None  Visit Diagnoses       Seasonal allergic rhinitis due to pollen  (Chronic)   -  Primary    Relevant Medications    dexAMETHasone (DECADRON) 0.75 MG Tab    Upper respiratory tract infection, unspecified type        Relevant Medications     azithromycin (Z-URBANO) 250 MG tablet    dexAMETHasone (DECADRON) 0.75 MG Tab    promethazine-dextromethorphan (PROMETHAZINE-DM) 6.25-15 mg/5 mL Syrp    Dermatitis  (Chronic)       Relevant Medications    fluconazole (DIFLUCAN) 150 MG Tab            The patient has no Health Maintenance topics of status Not Due    No future appointments.         Signature:  MD BABATUNDE Cheek [facility]  MEDICAL GROUP [facility]  [address] [place] [id number]  [phone]    Date of encounter: 11/16/23

## 2024-10-16 ENCOUNTER — OFFICE VISIT (OUTPATIENT)
Dept: FAMILY MEDICINE | Facility: CLINIC | Age: 74
End: 2024-10-16
Payer: MEDICARE

## 2024-10-16 VITALS
RESPIRATION RATE: 13 BRPM | HEIGHT: 69 IN | HEART RATE: 91 BPM | BODY MASS INDEX: 23.3 KG/M2 | OXYGEN SATURATION: 95 % | WEIGHT: 157.31 LBS | DIASTOLIC BLOOD PRESSURE: 63 MMHG | SYSTOLIC BLOOD PRESSURE: 98 MMHG | TEMPERATURE: 100 F

## 2024-10-16 DIAGNOSIS — R82.2 BILIRUBINURIA: ICD-10-CM

## 2024-10-16 DIAGNOSIS — R31.9 HEMATURIA, UNSPECIFIED TYPE: ICD-10-CM

## 2024-10-16 DIAGNOSIS — Z12.5 SCREENING FOR PROSTATE CANCER: ICD-10-CM

## 2024-10-16 DIAGNOSIS — R42 VERTIGO: ICD-10-CM

## 2024-10-16 DIAGNOSIS — I10 ESSENTIAL HYPERTENSION: ICD-10-CM

## 2024-10-16 DIAGNOSIS — R80.9 PROTEINURIA, UNSPECIFIED TYPE: ICD-10-CM

## 2024-10-16 DIAGNOSIS — R39.15 URINARY URGENCY: ICD-10-CM

## 2024-10-16 DIAGNOSIS — R35.0 URINARY FREQUENCY: Primary | ICD-10-CM

## 2024-10-16 DIAGNOSIS — R50.9 FEVER, UNSPECIFIED FEVER CAUSE: ICD-10-CM

## 2024-10-16 LAB
BILIRUB SERPL-MCNC: ABNORMAL MG/DL
BLOOD URINE, POC: ABNORMAL
CLARITY, UA: ABNORMAL
COLOR, UA: ABNORMAL
GLUCOSE UR QL STRIP: NEGATIVE
KETONES UR QL STRIP: 40
LEUKOCYTE ESTERASE URINE, POC: NEGATIVE
NITRITE, POC UA: NEGATIVE
PH, POC UA: 5.5
PROTEIN, POC: 100
SPECIFIC GRAVITY, POC UA: 1.03
UROBILINOGEN, POC UA: 0.2

## 2024-10-16 PROCEDURE — 85025 COMPLETE CBC W/AUTO DIFF WBC: CPT | Mod: ,,, | Performed by: CLINICAL MEDICAL LABORATORY

## 2024-10-16 PROCEDURE — 1126F AMNT PAIN NOTED NONE PRSNT: CPT | Mod: ,,, | Performed by: NURSE PRACTITIONER

## 2024-10-16 PROCEDURE — 1160F RVW MEDS BY RX/DR IN RCRD: CPT | Mod: ,,, | Performed by: NURSE PRACTITIONER

## 2024-10-16 PROCEDURE — 99214 OFFICE O/P EST MOD 30 MIN: CPT | Mod: ,,, | Performed by: NURSE PRACTITIONER

## 2024-10-16 PROCEDURE — 3074F SYST BP LT 130 MM HG: CPT | Mod: ,,, | Performed by: NURSE PRACTITIONER

## 2024-10-16 PROCEDURE — 1159F MED LIST DOCD IN RCRD: CPT | Mod: ,,, | Performed by: NURSE PRACTITIONER

## 2024-10-16 PROCEDURE — 3288F FALL RISK ASSESSMENT DOCD: CPT | Mod: ,,, | Performed by: NURSE PRACTITIONER

## 2024-10-16 PROCEDURE — 81003 URINALYSIS AUTO W/O SCOPE: CPT | Mod: RHCUB | Performed by: NURSE PRACTITIONER

## 2024-10-16 PROCEDURE — 1101F PT FALLS ASSESS-DOCD LE1/YR: CPT | Mod: ,,, | Performed by: NURSE PRACTITIONER

## 2024-10-16 PROCEDURE — 3078F DIAST BP <80 MM HG: CPT | Mod: ,,, | Performed by: NURSE PRACTITIONER

## 2024-10-16 RX ORDER — AMOXICILLIN 875 MG/1
875 TABLET, FILM COATED ORAL EVERY 12 HOURS
Qty: 14 TABLET | Refills: 0 | Status: SHIPPED | OUTPATIENT
Start: 2024-10-16 | End: 2024-10-23

## 2024-10-16 RX ORDER — VALSARTAN 320 MG/1
320 TABLET ORAL DAILY
COMMUNITY
Start: 2024-09-20

## 2024-10-16 NOTE — PROGRESS NOTES
Clinic note     Patient name: Eugene Morrow is a 74 y.o. male   Chief compliant   Chief Complaint   Patient presents with    Urinary Frequency     Pt c/o urinary urgency and frequency x 4 days, pt states he has had some vertigo that started yesterday.       Subjective     History of present illness   PCP: Dr Wayne who is not in clinic today     Past Medical History: HTN, HLD, GERD     In clinic for evaluation of urinary urgency and frequency x four days, denies any fever, n/v, dysuria             Social History     Tobacco Use    Smoking status: Every Day     Types: Cigarettes    Smokeless tobacco: Never   Substance Use Topics    Alcohol use: Yes       Review of patient's allergies indicates:   Allergen Reactions    Brilinta [ticagrelor]        Past Medical History:   Diagnosis Date    Hypertension        Past Surgical History:   Procedure Laterality Date    HERNIA REPAIR Right 2022        Family History   Problem Relation Name Age of Onset    Heart disease Mother           Current Outpatient Medications:     valsartan (DIOVAN) 320 MG tablet, Take 320 mg by mouth once daily., Disp: , Rfl:     amoxicillin (AMOXIL) 875 MG tablet, Take 1 tablet (875 mg total) by mouth every 12 (twelve) hours. for 7 days, Disp: 14 tablet, Rfl: 0    aspirin (ECOTRIN) 81 MG EC tablet, Take 81 mg by mouth once daily., Disp: , Rfl:     carvediloL (COREG) 3.125 MG tablet, Take 3.125 mg by mouth 2 (two) times daily with meals., Disp: , Rfl:     clopidogreL (PLAVIX) 75 mg tablet, Take 75 mg by mouth once daily., Disp: , Rfl:     esomeprazole (NEXIUM) 40 MG capsule, Take 40 mg by mouth before breakfast., Disp: , Rfl:     nitroGLYCERIN (NITROSTAT) 0.4 MG SL tablet, Place 0.4 mg under the tongue every 5 (five) minutes as needed for Chest pain., Disp: , Rfl:     rosuvastatin (CRESTOR) 40 MG Tab, Take 40 mg by mouth once daily., Disp: , Rfl:     Review of Systems   Constitutional:  Positive for fatigue. Negative for chills, fever and  "unexpected weight change.   Respiratory:  Negative for cough and shortness of breath.    Cardiovascular:  Negative for chest pain.   Gastrointestinal:  Negative for abdominal pain, blood in stool, constipation, diarrhea, nausea and vomiting.   Genitourinary:  Positive for frequency and urgency. Negative for difficulty urinating, dysuria, flank pain, hematuria and testicular pain.        Nocturia     Musculoskeletal:  Negative for back pain, gait problem and myalgias.   Neurological:  Positive for dizziness. Negative for headaches.       Objective     BP 98/63 (Patient Position: Sitting)   Pulse 91   Temp 99.5 °F (37.5 °C)   Resp 13   Ht 5' 9" (1.753 m)   Wt 71.4 kg (157 lb 4.8 oz)   SpO2 95%   BMI 23.23 kg/m²     Physical Exam   Constitutional: He is oriented to person, place, and time. normal appearance. No distress.   HENT:   Head: Atraumatic.   Mouth/Throat: Mucous membranes are moist.   Cardiovascular: Normal rate and regular rhythm. Pulmonary:      Effort: Pulmonary effort is normal. No respiratory distress.      Breath sounds: Normal breath sounds. No wheezing, rhonchi or rales.     Abdominal: Soft. Normal appearance and bowel sounds are normal. He exhibits no distension. There is no abdominal tenderness.   Musculoskeletal:         General: Normal range of motion.      Cervical back: Neck supple.      Right lower leg: No edema.      Left lower leg: No edema.   Neurological: He is alert and oriented to person, place, and time. Gait normal.   Skin: Skin is warm and dry.   Psychiatric: His behavior is normal. Mood normal.       Lab Results   Component Value Date    WBC 10.67 09/25/2020    HGB 12.6 (L) 09/25/2020    HCT 36.8 (L) 09/25/2020    MCV 94 09/25/2020     (L) 09/25/2020       CMP  Sodium   Date Value Ref Range Status   09/25/2020 136 136.0 - 145.0 mmol/L      Potassium   Date Value Ref Range Status   09/25/2020 4.0 3.5 - 5.1 mmol/L      Chloride   Date Value Ref Range Status   09/25/2020 102 " "98 - 107 mmol/L      CO2   Date Value Ref Range Status   09/25/2020 24 21 - 32 mmol/L      Glucose   Date Value Ref Range Status   09/25/2020 141 (H) 74 - 106 mg/dL      BUN   Date Value Ref Range Status   09/25/2020 23 (H) 9 - 20 mg/dL      Creatinine   Date Value Ref Range Status   09/25/2020 1.28 (H) 0.66 - 1.25 mg/dL      Calcium   Date Value Ref Range Status   09/25/2020 7.7 (L) 8.5 - 10.1 mg/dL      Total Protein   Date Value Ref Range Status   09/25/2020 6.5 6.4 - 8.2 g/dL      Albumin   Date Value Ref Range Status   09/25/2020 2.7 (L) 3.4 - 5.0 g/dL      Bilirubin, Total   Date Value Ref Range Status   09/25/2020 0.6 0.2 - 1.0 mg/dL      Alk Phos   Date Value Ref Range Status   09/25/2020 37 (L) 46 - 116 U/L      AST   Date Value Ref Range Status   09/25/2020 113 (H) 15 - 37 U/L      ALT   Date Value Ref Range Status   09/25/2020 33 14 - 63 U/L      Anion Gap   Date Value Ref Range Status   09/25/2020 14       eGFR    Date Value Ref Range Status   09/25/2020 71       eGFR   Date Value Ref Range Status   09/25/2020 59       No results found for: "TSH"  Lab Results   Component Value Date    CHOL 105 09/24/2020     Lab Results   Component Value Date    HDL 47 09/24/2020     Lab Results   Component Value Date    LDLCALC 41 09/24/2020     Lab Results   Component Value Date    TRIG 83 09/24/2020     Lab Results   Component Value Date    CHOLHDL 2.2 09/24/2020         Component 13:59   Color, UA POC dark yellow   Clarity, UA, POC cloudy   Spec Grav UA 1.030   pH, UA 5.5   WBC, UA negative   Nitrite, UA negative   Protein,    Glucose, UA negative   Ketones, UA 40   Bilirubin, POC small   Urobilinogen, UA 0.2   Blood, UA small   Resulting Agency [id number]             Specimen Collected: 10/16/24 13:59 CDT     No results found for: "LABA1C", "HGBA1C"      Assessment and Plan   Urinary frequency  -     POCT URINALYSIS W/O SCOPE  -     PSA, Screening; Future; Expected date: " 10/16/2024    Urinary urgency  -     POCT URINALYSIS W/O SCOPE    Essential hypertension  -     POCT URINALYSIS W/O SCOPE    Vertigo  -     POCT URINALYSIS W/O SCOPE    Fever, unspecified fever cause  -     CBC Auto Differential; Future; Expected date: 10/16/2024  -     Comprehensive Metabolic Panel; Future; Expected date: 10/16/2024  -     amoxicillin (AMOXIL) 875 MG tablet; Take 1 tablet (875 mg total) by mouth every 12 (twelve) hours. for 7 days  Dispense: 14 tablet; Refill: 0    Bilirubinuria  -     Comprehensive Metabolic Panel; Future; Expected date: 10/16/2024    Proteinuria, unspecified type  -     Comprehensive Metabolic Panel; Future; Expected date: 10/16/2024    Hematuria, unspecified type  -     CBC Auto Differential; Future; Expected date: 10/16/2024    Screening for prostate cancer  -     PSA, Screening; Future; Expected date: 10/16/2024          Patient Instructions  Patient Instructions   Lab obtained in clinic today, we will notify you of results and any necessary changes to plan of care     Amoxil as prescribed     Increase water/fluid intake     ED for worsening or concerning symptoms       Pt history, current symptoms and u/a results reviewed with Dr Rosas

## 2024-10-16 NOTE — PATIENT INSTRUCTIONS
Lab obtained in clinic today, we will notify you of results and any necessary changes to plan of care     Amoxil as prescribed     Increase water/fluid intake     ED for worsening or concerning symptoms

## 2024-10-17 LAB
ALBUMIN SERPL BCP-MCNC: 3.8 G/DL (ref 3.5–5)
ALBUMIN/GLOB SERPL: 1.1 {RATIO}
ALP SERPL-CCNC: 66 U/L (ref 45–115)
ALT SERPL W P-5'-P-CCNC: 16 U/L (ref 16–61)
ANION GAP SERPL CALCULATED.3IONS-SCNC: 13 MMOL/L (ref 7–16)
AST SERPL W P-5'-P-CCNC: 18 U/L (ref 15–37)
BASOPHILS # BLD AUTO: 0.05 K/UL (ref 0–0.2)
BASOPHILS NFR BLD AUTO: 0.4 % (ref 0–1)
BILIRUB SERPL-MCNC: 1.6 MG/DL (ref ?–1.2)
BUN SERPL-MCNC: 19 MG/DL (ref 7–18)
BUN/CREAT SERPL: 14 (ref 6–20)
CALCIUM SERPL-MCNC: 8.8 MG/DL (ref 8.5–10.1)
CHLORIDE SERPL-SCNC: 102 MMOL/L (ref 98–107)
CO2 SERPL-SCNC: 27 MMOL/L (ref 21–32)
CREAT SERPL-MCNC: 1.32 MG/DL (ref 0.7–1.3)
DIFFERENTIAL METHOD BLD: ABNORMAL
EGFR (NO RACE VARIABLE) (RUSH/TITUS): 56 ML/MIN/1.73M2
EOSINOPHIL # BLD AUTO: 0.03 K/UL (ref 0–0.5)
EOSINOPHIL NFR BLD AUTO: 0.2 % (ref 1–4)
ERYTHROCYTE [DISTWIDTH] IN BLOOD BY AUTOMATED COUNT: 12.1 % (ref 11.5–14.5)
GLOBULIN SER-MCNC: 3.6 G/DL (ref 2–4)
GLUCOSE SERPL-MCNC: 89 MG/DL (ref 74–106)
HCT VFR BLD AUTO: 46.8 % (ref 40–54)
HGB BLD-MCNC: 15.2 G/DL (ref 13.5–18)
IMM GRANULOCYTES # BLD AUTO: 0.11 K/UL (ref 0–0.04)
IMM GRANULOCYTES NFR BLD: 0.8 % (ref 0–0.4)
LYMPHOCYTES # BLD AUTO: 1.41 K/UL (ref 1–4.8)
LYMPHOCYTES NFR BLD AUTO: 10.6 % (ref 27–41)
MCH RBC QN AUTO: 31.5 PG (ref 27–31)
MCHC RBC AUTO-ENTMCNC: 32.5 G/DL (ref 32–36)
MCV RBC AUTO: 96.9 FL (ref 80–96)
MONOCYTES # BLD AUTO: 1.11 K/UL (ref 0–0.8)
MONOCYTES NFR BLD AUTO: 8.3 % (ref 2–6)
MPC BLD CALC-MCNC: 10.8 FL (ref 9.4–12.4)
NEUTROPHILS # BLD AUTO: 10.59 K/UL (ref 1.8–7.7)
NEUTROPHILS NFR BLD AUTO: 79.7 % (ref 53–65)
NRBC # BLD AUTO: 0 X10E3/UL
NRBC, AUTO (.00): 0 %
PLATELET # BLD AUTO: 146 K/UL (ref 150–400)
POTASSIUM SERPL-SCNC: 4.8 MMOL/L (ref 3.5–5.1)
PROT SERPL-MCNC: 7.4 G/DL (ref 6.4–8.2)
PSA SERPL-MCNC: 3.49 NG/ML
RBC # BLD AUTO: 4.83 M/UL (ref 4.6–6.2)
SODIUM SERPL-SCNC: 137 MMOL/L (ref 136–145)
WBC # BLD AUTO: 13.3 K/UL (ref 4.5–11)

## 2025-01-06 ENCOUNTER — OFFICE VISIT (OUTPATIENT)
Dept: FAMILY MEDICINE | Facility: CLINIC | Age: 75
End: 2025-01-06
Payer: MEDICARE

## 2025-01-06 VITALS
BODY MASS INDEX: 23.11 KG/M2 | DIASTOLIC BLOOD PRESSURE: 78 MMHG | SYSTOLIC BLOOD PRESSURE: 144 MMHG | WEIGHT: 156 LBS | HEIGHT: 69 IN | HEART RATE: 75 BPM

## 2025-01-06 DIAGNOSIS — R06.2 WHEEZING: ICD-10-CM

## 2025-01-06 DIAGNOSIS — R09.89 CHEST CONGESTION: Primary | ICD-10-CM

## 2025-01-06 DIAGNOSIS — R68.83 CHILLS: ICD-10-CM

## 2025-01-06 DIAGNOSIS — J34.89 RHINORRHEA: ICD-10-CM

## 2025-01-06 DIAGNOSIS — R05.1 ACUTE COUGH: ICD-10-CM

## 2025-01-06 PROCEDURE — 1159F MED LIST DOCD IN RCRD: CPT | Mod: ,,, | Performed by: FAMILY MEDICINE

## 2025-01-06 PROCEDURE — 1101F PT FALLS ASSESS-DOCD LE1/YR: CPT | Mod: ,,, | Performed by: FAMILY MEDICINE

## 2025-01-06 PROCEDURE — 3078F DIAST BP <80 MM HG: CPT | Mod: ,,, | Performed by: FAMILY MEDICINE

## 2025-01-06 PROCEDURE — 3288F FALL RISK ASSESSMENT DOCD: CPT | Mod: ,,, | Performed by: FAMILY MEDICINE

## 2025-01-06 PROCEDURE — 3077F SYST BP >= 140 MM HG: CPT | Mod: ,,, | Performed by: FAMILY MEDICINE

## 2025-01-06 PROCEDURE — 96372 THER/PROPH/DIAG INJ SC/IM: CPT | Mod: ,,, | Performed by: FAMILY MEDICINE

## 2025-01-06 PROCEDURE — 99214 OFFICE O/P EST MOD 30 MIN: CPT | Mod: 25,,, | Performed by: FAMILY MEDICINE

## 2025-01-06 RX ORDER — DEXAMETHASONE SODIUM PHOSPHATE 4 MG/ML
4 INJECTION, SOLUTION INTRA-ARTICULAR; INTRALESIONAL; INTRAMUSCULAR; INTRAVENOUS; SOFT TISSUE
Status: COMPLETED | OUTPATIENT
Start: 2025-01-06 | End: 2025-01-06

## 2025-01-06 RX ORDER — CODEINE PHOSPHATE AND GUAIFENESIN 10; 100 MG/5ML; MG/5ML
5 SOLUTION ORAL 3 TIMES DAILY PRN
Qty: 118 ML | Refills: 0 | Status: SHIPPED | OUTPATIENT
Start: 2025-01-06 | End: 2025-01-16

## 2025-01-06 RX ORDER — CEFTRIAXONE 1 G/1
1 INJECTION, POWDER, FOR SOLUTION INTRAMUSCULAR; INTRAVENOUS
Status: COMPLETED | OUTPATIENT
Start: 2025-01-06 | End: 2025-01-06

## 2025-01-06 RX ORDER — AZITHROMYCIN 250 MG/1
TABLET, FILM COATED ORAL
Qty: 6 TABLET | Refills: 0 | Status: SHIPPED | OUTPATIENT
Start: 2025-01-06 | End: 2025-01-11

## 2025-01-06 RX ADMIN — DEXAMETHASONE SODIUM PHOSPHATE 4 MG: 4 INJECTION, SOLUTION INTRA-ARTICULAR; INTRALESIONAL; INTRAMUSCULAR; INTRAVENOUS; SOFT TISSUE at 03:01

## 2025-01-06 RX ADMIN — CEFTRIAXONE 1 G: 1 INJECTION, POWDER, FOR SOLUTION INTRAMUSCULAR; INTRAVENOUS at 03:01

## 2025-01-06 NOTE — PROGRESS NOTES
"              Jamie Rosas IV, DO  [facility]  [address] [place] [address]  Phone: [phone]      Subjective     Name: Eugene Morrow   Sex: male  YOB: 1950 (74 y.o.)  MRN: [id number]  Visit Date: 01/06/2025   Chief Complaint: Cough and Sinus Problem        HISTORY OF PRESENT ILLNESS:    Chief Complaint   Patient presents with    Cough    Sinus Problem       Acute issue with systemic symptoms including chills and wheezing.  See tests that keep you healthy and the problem oriented documentation below.    All of your core healthy metrics are met.      Portions of this note may have been created with voice recognition software. Occasional "wrong-word" or "sound-a-like" substitutions may have occurred due to the inherent limitations of voice recognition software. Please, read the note carefully and recognize, using context, where substitutions have occurred.     PAST MEDICAL HISTORY:  Significant Diagnoses - Patient  has a past medical history of Hypertension.  Medications - Patient has a current medication list which includes the following long-term medication(s): aspirin, carvedilol, clopidogrel, esomeprazole, nitroglycerin, rosuvastatin, and valsartan.   Allergies - Patient is allergic to brilinta [ticagrelor].  Surgeries - Patient  has a past surgical history that includes Hernia repair (Right, 2022).  Family History - Patient family history includes Heart disease in his mother.      SOCIAL HISTORY:  Tobacco - Patient  reports that he has been smoking cigarettes. He has never used smokeless tobacco.   Alcohol - Patient  reports current alcohol use.   Recreational Drugs - Patient  has no history on file for drug use.       Review of Systems   All other systems reviewed and are negative.       Past Medical History:   Diagnosis Date    Hypertension         Review of patient's allergies indicates:   Allergen Reactions    Brilinta [ticagrelor]         Past " "Surgical History:   Procedure Laterality Date    HERNIA REPAIR Right 2022        Family History   Problem Relation Name Age of Onset    Heart disease Mother         Current Outpatient Medications   Medication Instructions    aspirin (ECOTRIN) 81 mg, Daily    azithromycin (Z-RUBANO) 250 MG tablet Take 2 tablets by mouth on day 1; Take 1 tablet by mouth on days 2-5      carvediloL (COREG) 3.125 mg, 2 times daily with meals    clopidogreL (PLAVIX) 75 mg, Daily    esomeprazole (NEXIUM) 40 mg, Before breakfast    guaiFENesin-codeine 100-10 mg/5 ml (TUSSI-ORGANIDIN NR)  mg/5 mL syrup 5 mLs, Oral, 3 times daily PRN    nitroGLYCERIN (NITROSTAT) 0.4 mg, Every 5 min PRN    rosuvastatin (CRESTOR) 40 mg, Daily    valsartan (DIOVAN) 320 mg, Daily        Objective     BP (!) 144/78   Pulse 75   Ht 5' 9" (1.753 m)   Wt 70.8 kg (156 lb)   BMI 23.04 kg/m²     Physical Exam  Constitutional:       General: He is not in acute distress.     Appearance: Normal appearance. He is not ill-appearing.   HENT:      Head: Normocephalic and atraumatic.      Right Ear: External ear normal.      Left Ear: External ear normal.      Nose: Nose normal.   Eyes:      Extraocular Movements: Extraocular movements intact.   Cardiovascular:      Rate and Rhythm: Normal rate.   Pulmonary:      Effort: Pulmonary effort is normal.   Abdominal:      Palpations: Abdomen is soft.   Musculoskeletal:      Cervical back: Normal range of motion. No tenderness.   Neurological:      Mental Status: He is alert.   Psychiatric:         Mood and Affect: Mood normal.         Behavior: Behavior normal.        All recently obtained labs have been reviewed and discussed in detail with the patient.   Assessment     1. Chest congestion    2. Rhinorrhea    3. Acute cough    4. Wheezing    5. Chills         Plan        Problem List Items Addressed This Visit    None  Visit Diagnoses       Chest congestion    -  Primary    Rhinorrhea        Acute cough        " Relevant Medications    guaiFENesin-codeine 100-10 mg/5 ml (TUSSI-ORGANIDIN NR)  mg/5 mL syrup    Wheezing        Relevant Medications    dexAMETHasone injection 4 mg (Start on 1/6/2025  3:30 PM)    Chills        Relevant Medications    cefTRIAXone injection 1 g (Start on 1/6/2025  3:30 PM)    azithromycin (Z-URBANO) 250 MG tablet            No follow-ups on file.    Patient advised that is symptoms worsen, they should call or report directly to local emergency department.    Jamie Rosas DO  [facility]

## 2025-01-21 ENCOUNTER — OFFICE VISIT (OUTPATIENT)
Dept: FAMILY MEDICINE | Facility: CLINIC | Age: 75
End: 2025-01-21
Payer: MEDICARE

## 2025-01-21 ENCOUNTER — APPOINTMENT (OUTPATIENT)
Dept: RADIOLOGY | Facility: CLINIC | Age: 75
End: 2025-01-21
Attending: NURSE PRACTITIONER
Payer: MEDICARE

## 2025-01-21 VITALS
WEIGHT: 151.63 LBS | BODY MASS INDEX: 22.46 KG/M2 | OXYGEN SATURATION: 96 % | SYSTOLIC BLOOD PRESSURE: 100 MMHG | HEIGHT: 69 IN | HEART RATE: 78 BPM | DIASTOLIC BLOOD PRESSURE: 65 MMHG

## 2025-01-21 DIAGNOSIS — J22 LOWER RESP. TRACT INFECTION: ICD-10-CM

## 2025-01-21 DIAGNOSIS — J22 LOWER RESP. TRACT INFECTION: Primary | ICD-10-CM

## 2025-01-21 PROCEDURE — 1126F AMNT PAIN NOTED NONE PRSNT: CPT | Mod: ,,, | Performed by: NURSE PRACTITIONER

## 2025-01-21 PROCEDURE — 99213 OFFICE O/P EST LOW 20 MIN: CPT | Mod: ,,, | Performed by: NURSE PRACTITIONER

## 2025-01-21 PROCEDURE — 3288F FALL RISK ASSESSMENT DOCD: CPT | Mod: ,,, | Performed by: NURSE PRACTITIONER

## 2025-01-21 PROCEDURE — 1101F PT FALLS ASSESS-DOCD LE1/YR: CPT | Mod: ,,, | Performed by: NURSE PRACTITIONER

## 2025-01-21 PROCEDURE — 1160F RVW MEDS BY RX/DR IN RCRD: CPT | Mod: ,,, | Performed by: NURSE PRACTITIONER

## 2025-01-21 PROCEDURE — 1159F MED LIST DOCD IN RCRD: CPT | Mod: ,,, | Performed by: NURSE PRACTITIONER

## 2025-01-21 PROCEDURE — 3078F DIAST BP <80 MM HG: CPT | Mod: ,,, | Performed by: NURSE PRACTITIONER

## 2025-01-21 PROCEDURE — 3074F SYST BP LT 130 MM HG: CPT | Mod: ,,, | Performed by: NURSE PRACTITIONER

## 2025-01-21 PROCEDURE — 71046 X-RAY EXAM CHEST 2 VIEWS: CPT | Mod: TC,RHCUB,FY | Performed by: NURSE PRACTITIONER

## 2025-01-21 RX ORDER — DOXYCYCLINE 100 MG/1
100 CAPSULE ORAL 2 TIMES DAILY
Qty: 14 CAPSULE | Refills: 0 | Status: SHIPPED | OUTPATIENT
Start: 2025-01-21 | End: 2025-01-28

## 2025-01-21 NOTE — PATIENT INSTRUCTIONS
Chest xray obtained, will notify of results when available     Doxycycline as prescribed   Increase water intake, powerade, Gatorade  Over the counter mucinex as needed for congestion   Cool mist humidifier at bedside may also be helpful     Follow up with Dr Wayne in 3-5 days if symptoms persist, sooner if symptoms worsen     ED for shortness of breath, fever that does not respond to medication, chest pain, signs of dehydration

## 2025-01-21 NOTE — PROGRESS NOTES
Clinic note     Patient name: Eugene Morrow is a 74 y.o. male   Chief compliant   Chief Complaint   Patient presents with    Sinus Problem     Was seen two weeks ago by dr. Rosas. States he started feeling bad again on Thursday. C/o of fatigue, cough congestion no apeitiet. No fever.        Subjective     History of present illness   PCP: Dr Wayne     In clinic for evaluation of cough and chest congestion, no appetite and fatigue  He was seen by Dr Rosas on 2025, record reviewed   He states symptoms resolved after being treated on 2025 then current symptoms started on Thursday, five days ago     Past Medical History: HTN, HLD, GERD, CAD    Smoker,  states he quit two weeks ago           Social History     Tobacco Use    Smoking status: Former     Current packs/day: 0.00     Average packs/day: 0.5 packs/day for 47.0 years (23.5 ttl pk-yrs)     Types: Cigarettes     Start date:      Quit date: 2025     Years since quittin.0     Passive exposure: Past    Smokeless tobacco: Never   Substance Use Topics    Alcohol use: Yes    Drug use: Never       Review of patient's allergies indicates:   Allergen Reactions    Brilinta [ticagrelor]        Past Medical History:   Diagnosis Date    Hypertension        Past Surgical History:   Procedure Laterality Date    CARDIAC CATHETERIZATION      with stents    HERNIA REPAIR Right         Family History   Problem Relation Name Age of Onset    Heart disease Mother           Current Outpatient Medications:     aspirin (ECOTRIN) 81 MG EC tablet, Take 81 mg by mouth once daily., Disp: , Rfl:     carvediloL (COREG) 3.125 MG tablet, Take 3.125 mg by mouth 2 (two) times daily with meals., Disp: , Rfl:     clopidogreL (PLAVIX) 75 mg tablet, Take 75 mg by mouth once daily., Disp: , Rfl:     esomeprazole (NEXIUM) 40 MG capsule, Take 40 mg by mouth before breakfast., Disp: , Rfl:     nitroGLYCERIN (NITROSTAT) 0.4 MG SL tablet, Place 0.4 mg under the tongue  "every 5 (five) minutes as needed for Chest pain., Disp: , Rfl:     rosuvastatin (CRESTOR) 40 MG Tab, Take 40 mg by mouth once daily., Disp: , Rfl:     valsartan (DIOVAN) 320 MG tablet, Take 320 mg by mouth once daily., Disp: , Rfl:     doxycycline (VIBRAMYCIN) 100 MG Cap, Take 1 capsule (100 mg total) by mouth 2 (two) times daily. for 7 days, Disp: 14 capsule, Rfl: 0    Review of Systems   Constitutional:  Positive for appetite change and fatigue. Negative for chills and fever.   HENT:  Negative for nasal congestion and sore throat.    Respiratory:  Positive for cough. Negative for shortness of breath.    Cardiovascular:  Negative for chest pain.   Gastrointestinal:  Negative for diarrhea, nausea and vomiting.   Musculoskeletal:  Negative for myalgias.   Neurological:  Negative for headaches.       Objective     /65   Pulse 78   Ht 5' 9" (1.753 m)   Wt 68.8 kg (151 lb 9.6 oz)   SpO2 96%   BMI 22.39 kg/m²     Physical Exam   Constitutional: He is oriented to person, place, and time. normal appearance. No distress.   HENT:   Head: Atraumatic.   Nose: Nose normal.   Mouth/Throat: Oropharynx is clear and moist. Mucous membranes are moist.   Cardiovascular: Normal rate and regular rhythm. Pulmonary:      Effort: Pulmonary effort is normal. No respiratory distress.      Breath sounds: Normal breath sounds. No wheezing, rhonchi or rales.     Abdominal: Soft. Normal appearance and bowel sounds are normal. He exhibits no distension. There is no abdominal tenderness.   Musculoskeletal:         General: Normal range of motion.      Cervical back: Neck supple.      Right lower leg: No edema.      Left lower leg: No edema.   Neurological: He is alert and oriented to person, place, and time. Gait normal.   Skin: Skin is warm and dry.   Psychiatric: His behavior is normal. Mood normal.       Lab Results   Component Value Date    WBC 13.30 (H) 10/16/2024    HGB 15.2 10/16/2024    HCT 46.8 10/16/2024    MCV 96.9 (H) " "10/16/2024     (L) 10/16/2024       CMP  Sodium   Date Value Ref Range Status   10/16/2024 137 136 - 145 mmol/L Final     Potassium   Date Value Ref Range Status   10/16/2024 4.8 3.5 - 5.1 mmol/L Final     Chloride   Date Value Ref Range Status   10/16/2024 102 98 - 107 mmol/L Final     CO2   Date Value Ref Range Status   10/16/2024 27 21 - 32 mmol/L Final     Glucose   Date Value Ref Range Status   10/16/2024 89 74 - 106 mg/dL Final     BUN   Date Value Ref Range Status   10/16/2024 19 (H) 7 - 18 mg/dL Final     Creatinine   Date Value Ref Range Status   10/16/2024 1.32 (H) 0.70 - 1.30 mg/dL Final     Calcium   Date Value Ref Range Status   10/16/2024 8.8 8.5 - 10.1 mg/dL Final     Total Protein   Date Value Ref Range Status   10/16/2024 7.4 6.4 - 8.2 g/dL Final     Albumin   Date Value Ref Range Status   10/16/2024 3.8 3.5 - 5.0 g/dL Final     Bilirubin, Total   Date Value Ref Range Status   10/16/2024 1.6 (H) >0.0 - 1.2 mg/dL Final     Alk Phos   Date Value Ref Range Status   10/16/2024 66 45 - 115 U/L Final     AST   Date Value Ref Range Status   10/16/2024 18 15 - 37 U/L Final     ALT   Date Value Ref Range Status   10/16/2024 16 16 - 61 U/L Final     Anion Gap   Date Value Ref Range Status   10/16/2024 13 7 - 16 mmol/L Final     eGFR    Date Value Ref Range Status   09/25/2020 71       eGFR   Date Value Ref Range Status   09/25/2020 59       No results found for: "TSH"  Lab Results   Component Value Date    CHOL 105 09/24/2020     Lab Results   Component Value Date    HDL 47 09/24/2020     Lab Results   Component Value Date    LDLCALC 41 09/24/2020     Lab Results   Component Value Date    TRIG 83 09/24/2020     Lab Results   Component Value Date    CHOLHDL 2.2 09/24/2020     No results found for: "LABA1C", "HGBA1C"      Assessment and Plan   Lower resp. tract infection  -     X-Ray Chest PA And Lateral; Future; Expected date: 01/21/2025  -     doxycycline (VIBRAMYCIN) 100 MG Cap; Take 1 " capsule (100 mg total) by mouth 2 (two) times daily. for 7 days  Dispense: 14 capsule; Refill: 0          Patient Instructions  Patient Instructions   Chest xray obtained, will notify of results when available     Doxycycline as prescribed   Increase water intake, powerade, Gatorade  Over the counter mucinex as needed for congestion   Cool mist humidifier at bedside may also be helpful     Follow up with Dr Wayne in 3-5 days if symptoms persist, sooner if symptoms worsen     ED for shortness of breath, fever that does not respond to medication, chest pain, signs of dehydration

## 2025-08-08 ENCOUNTER — OFFICE VISIT (OUTPATIENT)
Dept: FAMILY MEDICINE | Facility: CLINIC | Age: 75
End: 2025-08-08
Payer: MEDICARE

## 2025-08-08 VITALS
SYSTOLIC BLOOD PRESSURE: 133 MMHG | HEIGHT: 69 IN | WEIGHT: 148.31 LBS | HEART RATE: 64 BPM | DIASTOLIC BLOOD PRESSURE: 76 MMHG | BODY MASS INDEX: 21.97 KG/M2 | OXYGEN SATURATION: 98 % | TEMPERATURE: 98 F

## 2025-08-08 DIAGNOSIS — I10 ESSENTIAL HYPERTENSION: Chronic | ICD-10-CM

## 2025-08-08 DIAGNOSIS — U07.1 COVID-19: Primary | ICD-10-CM

## 2025-08-08 DIAGNOSIS — R05.1 ACUTE COUGH: ICD-10-CM

## 2025-08-08 DIAGNOSIS — R06.2 WHEEZING: ICD-10-CM

## 2025-08-08 DIAGNOSIS — Z87.09 HISTORY OF ASTHMA: Chronic | ICD-10-CM

## 2025-08-08 RX ORDER — PROMETHAZINE HYDROCHLORIDE AND DEXTROMETHORPHAN HYDROBROMIDE 6.25; 15 MG/5ML; MG/5ML
5 SYRUP ORAL EVERY 6 HOURS PRN
Qty: 120 ML | Refills: 0 | Status: SHIPPED | OUTPATIENT
Start: 2025-08-08 | End: 2025-08-18

## 2025-08-08 RX ORDER — ALBUTEROL SULFATE 90 UG/1
2 INHALANT RESPIRATORY (INHALATION) EVERY 4 HOURS PRN
COMMUNITY
End: 2025-08-08 | Stop reason: SDUPTHER

## 2025-08-08 RX ORDER — ALBUTEROL SULFATE 90 UG/1
2 INHALANT RESPIRATORY (INHALATION) EVERY 4 HOURS PRN
Qty: 18 G | Refills: 1 | Status: SHIPPED | OUTPATIENT
Start: 2025-08-08

## 2025-08-08 NOTE — PROGRESS NOTES
Domingo Wayne MD   [facility]  MEDICAL [facility] - FAMILY MEDICINE  [address] [place] [id number]  [phone]      PATIENT NAME: Eugene Morrow  : 1950  DATE: 25  MRN: [id number]      Billing Provider: Domingo Wayne MD  Level of Service: IN OFFICE/OUTPT VISIT, EST, LEVL IV, 30-39 MIN  Patient PCP Information       Provider PCP Type    Domingo Wayne MD General            Reason for Visit / Chief Complaint: Nasal Congestion, Cough, Headache, and Fever (X 1 week)       Update PCP  Update Chief Complaint         History of Present Illness / Problem Focused Workflow     Eugene Morrow presents to the clinic with Nasal Congestion, Cough, Headache, and Fever (X 1 week)       Sinus congestion, drainage, cough and wheezing.  Says that he has been running fever.  Wife has similar symptoms and is positive for COVID.  He declines testing.  Wants inhaler for wheezing.  Says that he had been diagnosed with asthma as a child but did not have any problems with it for decades.  HTN- BP is doing well.      Cough  Associated symptoms include a fever, headaches, postnasal drip and rhinorrhea.   Headache   Associated symptoms include coughing, a fever, rhinorrhea and sinus pressure.   Fever   Associated symptoms include congestion, coughing and headaches.       Review of Systems     Review of Systems   Constitutional:  Positive for fever.   HENT:  Positive for nasal congestion, postnasal drip, rhinorrhea and sinus pressure/congestion.    Respiratory:  Positive for cough.    Neurological:  Positive for headaches.        Medical / Social / Family History     Past Medical History:   Diagnosis Date    Hypertension        Past Surgical History:   Procedure Laterality Date    CARDIAC CATHETERIZATION      with stents    HERNIA REPAIR Right        Social History    reports that he quit smoking about 7 months ago. His smoking use included cigarettes. He  started smoking about 47 years ago. He has a 23.5 pack-year smoking history. He has been exposed to tobacco smoke. He has never used smokeless tobacco. He reports current alcohol use. He reports that he does not use drugs.   Social History[1]    Family History  Family History   Problem Relation Name Age of Onset    Heart disease Mother         Medications and Allergies     Medications  Outpatient Medications Marked as Taking for the 8/8/25 encounter (Office Visit) with Domingo Wayne MD   Medication Sig Dispense Refill    aspirin (ECOTRIN) 81 MG EC tablet Take 81 mg by mouth once daily.      carvediloL (COREG) 3.125 MG tablet Take 3.125 mg by mouth 2 (two) times daily with meals.      clopidogreL (PLAVIX) 75 mg tablet Take 75 mg by mouth once daily.      esomeprazole (NEXIUM) 40 MG capsule Take 40 mg by mouth before breakfast.      nitroGLYCERIN (NITROSTAT) 0.4 MG SL tablet Place 0.4 mg under the tongue every 5 (five) minutes as needed for Chest pain.      rosuvastatin (CRESTOR) 40 MG Tab Take 40 mg by mouth once daily.      valsartan (DIOVAN) 320 MG tablet Take 320 mg by mouth once daily.      [DISCONTINUED] albuterol (PROVENTIL/VENTOLIN HFA) 90 mcg/actuation inhaler Inhale 2 puffs into the lungs every 4 (four) hours as needed for Wheezing.         Allergies  Review of patient's allergies indicates:   Allergen Reactions    Brilinta [ticagrelor]        Physical Examination     Vitals:    08/08/25 1059   BP: 133/76   Pulse: 64   Temp: 97.8 °F (36.6 °C)     Physical Exam  Vitals reviewed.   Constitutional:       General: He is not in acute distress.     Appearance: Normal appearance. He is not ill-appearing or toxic-appearing.   HENT:      Head: Normocephalic and atraumatic.   Eyes:      Extraocular Movements: Extraocular movements intact.      Conjunctiva/sclera: Conjunctivae normal.      Pupils: Pupils are equal, round, and reactive to light.   Cardiovascular:      Rate and Rhythm: Normal rate.   Pulmonary:       Effort: Pulmonary effort is normal.   Musculoskeletal:         General: Normal range of motion.      Cervical back: Normal range of motion and neck supple.   Skin:     General: Skin is warm and dry.   Neurological:      General: No focal deficit present.      Mental Status: He is alert and oriented to person, place, and time.   Psychiatric:         Mood and Affect: Mood normal.         Behavior: Behavior normal.          Assessment and Plan (including Health Maintenance)      Problem List  Smart Sets  Document Outside HM   :    Plan:         Health Maintenance Due   Topic Date Due    Hepatitis C Screening  Never done    Lipid Panel  Never done    TETANUS VACCINE  Never done    LDCT Lung Screen  Never done    Shingles Vaccine (1 of 2) Never done    Pneumococcal Vaccines (Age 50+) (1 of 1 - PCV) Never done    RSV Vaccine (Age 60+ and Pregnant patients) (1 - 1-dose 75+ series) Never done    COVID-19 Vaccine (1 - 2024-25 season) Never done       Problem List Items Addressed This Visit    None  Visit Diagnoses         COVID-19    -  Primary    Relevant Medications    albuterol (PROVENTIL/VENTOLIN HFA) 90 mcg/actuation inhaler      Acute cough        Relevant Medications    promethazine-dextromethorphan (PROMETHAZINE-DM) 6.25-15 mg/5 mL Syrp      Wheezing          History of asthma  (Chronic)         Essential hypertension  (Chronic)               Health Maintenance Topics with due status: Not Due       Topic Last Completion Date    Influenza Vaccine Not Due       Future Appointments   Date Time Provider Department Center   3/12/2026 10:00 AM AWV NURSE, BABATUNDE [place] [facility] [place] [place] FAMILY MEDICINE [facility]            Signature:  MD BABATUNDE Cheek [facility]  MEDICAL [facility] FAMILY MEDICINE  [address] [place] [id number]  [phone]    Date of encounter: 8/8/25         [1]   Social History  Tobacco Use    Smoking status: Former     Current packs/day: 0.00     Average  packs/day: 0.5 packs/day for 47.0 years (23.5 ttl pk-yrs)     Types: Cigarettes     Start date:      Quit date: 2025     Years since quittin.6     Passive exposure: Past    Smokeless tobacco: Never   Substance Use Topics    Alcohol use: Yes    Drug use: Never